# Patient Record
(demographics unavailable — no encounter records)

---

## 2024-10-29 NOTE — HISTORY OF PRESENT ILLNESS
[FreeTextEntry1] : establish care annual physical [de-identified] : Ms. YOON is a 67 y/o F with PMHx of who presents today for new patient eval and establish care. Patient feels well at this time, no complaints. Denies chest pain, sob, schmitt, dizziness, diaphoresis, palpitations, LE swelling, orthopnea, syncope, n/v, headache.

## 2024-10-29 NOTE — HEALTH RISK ASSESSMENT
[0] : 2) Feeling down, depressed, or hopeless: Not at all (0) [PHQ-2 Negative - No further assessment needed] : PHQ-2 Negative - No further assessment needed [Never] : Never [[id number]] : 0

## 2024-10-29 NOTE — HISTORY OF PRESENT ILLNESS
[FreeTextEntry1] : establish care annual physical [de-identified] : Ms. YOON is a 67 y/o F with PMHx of who presents today for new patient eval and establish care. Patient feels well at this time, no complaints. Denies chest pain, sob, schmitt, dizziness, diaphoresis, palpitations, LE swelling, orthopnea, syncope, n/v, headache.

## 2024-10-29 NOTE — ADDENDUM
[FreeTextEntry1] : This note was written by Angela Plummer on 10/28/2024 acting as medical scribe for Dr. Ruthann Wellington. I, Dr. Ruthann Wellington, have read and attest that all the information, medical decision making and discharge instructions within are true and accurate.

## 2024-10-29 NOTE — HEALTH RISK ASSESSMENT
[0] : 2) Feeling down, depressed, or hopeless: Not at all (0) [PHQ-2 Negative - No further assessment needed] : PHQ-2 Negative - No further assessment needed [Never] : Never [[id number]] : 0

## 2024-12-09 NOTE — ADDENDUM
[FreeTextEntry1] : Today's evaluation reviewed with Dr. Pritchard along with any changes and plan of care.

## 2024-12-09 NOTE — HISTORY OF PRESENT ILLNESS
[FreeTextEntry1] : Ms. YOON is a 69-year-old female who returns with regard to a history of hair thinning and obesity.  Regarding the hair thinning, the patient had been experiencing hair thinning and was following with dermatologist who found elevated testosterone level. Has been using a laser cap for the hair loss but has not seen any benefit. Hair thinning started in 2015.  Continues on Nutrofol at this time along with Viviscal. also on hair nail supplement as well as Minoxidil + Finasteride.  She did go for PRP which did provide some benefit, 3 sessions so far and is due for a session in November.  She reports nothing working and feels there has been an ongoing progression of hair thinning and hair loss since her last visit at last visit we suggested microneedling - following with derm  she is aware that GLP1 can play a role in hair thinning  ____________________________________________________________________________________________________  Regarding her weight, the patient is currently taking Mounjaro 12.5 mg but she takes it every 10 days.  She is tolerating the medication well with no side effects - Insurance does not cover Ozempic. She is paying $500 out-of-pocket for Mounjaro.  Her current weight is 177 lb  prior 180 lb in sept 2024   it does help with appetite suppression She is trying to eat more protein and fiber  She avoids alcohol   Lowest weight within last 5-10 years has been 170 lbs. Notes that her diet does not include processed foods. Diet is low in carbohydrates, but appetite is great, and she does eat large portions.  physical activity is minimal    She does have a history of Prediabetes and had gestational DM about 30 years ago and was using insulin.  Too with pos TPO ab with ANti TG Ab neg.  __________________________________________________________________________ Additional medical history includes that of NAFLD, Osteopenia (latest dexa 2024 and will send records), frequent UTIs on vaginal Estrace and Nitrofurantoin. estradiol 2x a week topical - Has had elevated Ferritin level and did see hematology. Was told no intervention required. Testing for mutations was negative.  off vitamin D3, pomi-t, turmeric  DEXA 12/14/23  spine -1.3  left Fn -2.2  left TH -1.3   FRAx major 12%  hip 2.2%

## 2025-02-04 NOTE — ADDENDUM
[FreeTextEntry1] : This note was written by Katia Camargo on 02/04/2025 acting as medical scribe for Dr. Aldo Pritchard. I, Dr. Aldo Pritchard, have read and attest that all the information, medical decision making and discharge instructions within are true and accurate.

## 2025-02-04 NOTE — HISTORY OF PRESENT ILLNESS
[FreeTextEntry1] : Ms. YOON is a 69-year-old female who returns with regard to a history of hair thinning and obesity.  Additional medical history includes that of NAFLD, Osteopenia (latest dexa 2024 and will send records), frequent UTIs on vaginal Estrace and Nitrofurantoin. estradiol 2x a week topical - Has had elevated Ferritin level and did see hematology. Was told no intervention required. Testing for mutations was negative.  Regarding the hair thinning, the patient had been experiencing hair thinning and was following with dermatologist who found elevated testosterone level. Has been using a laser cap for the hair loss but has not seen any benefit. Hair thinning started in 2015.  Continues on Nutrofol at this time along with Viviscal. also on hair nail supplement as well as Minoxidil + Finasteride.  She did go for PRP which did provide some benefit, 3 sessions so far and is due for a session in November.  She reports nothing working and feels there has been an ongoing progression of hair thinning and hair loss since her last visit At last visit we suggested microneedling - following with derm  She is aware that GLP1 can play a role in hair thinning  ____________________________________________________________________________________________________ Regarding her weight, the patient is currently taking Mounjaro 12.5 mg, but she takes it every 10 days.  She is tolerating the medication well with no side effects. - Insurance does not cover Ozempic. She is paying $500 out-of-pocket for Mounjaro.  Her current weight is 170 pounds, previously 175 pounds in Dec 2024.  Mounjaro does help with appetite suppression. She is trying to eat more protein and fiber. She avoids alcohol.  Lowest weight within last 5-10 years has been 170 lbs. Notes that her diet does not include processed foods. Diet is low in carbohydrates, but appetite is great, and she does eat large portions.  Physical activity is minimal   She does have a history of Prediabetes and had gestational DM about 30 years ago and was using insulin. - The patient's latest A1C returned at 5.5%. on 08/05/24  Too with pos TPO ab with ANti TG Ab neg.  ____________________________________________________________________________________________________ Is taking vitamin D along with fish oil.  DEXA 12/14/23  spine -1.3  left Fn -2.2  left TH -1.3   FRAx major 12%  hip 2.2%

## 2025-06-08 NOTE — ADDENDUM
[FreeTextEntry1] : This note was written by Hira Varghese on 06/06/2025 acting as medical scribe for Dr. Ruthann Wellington. I, Dr. Ruthann Wellington, have read and attest that all the information, medical decision making and discharge instructions within are true and accurate.

## 2025-06-08 NOTE — HEALTH RISK ASSESSMENT
[0] : 2) Feeling down, depressed, or hopeless: Not at all (0) [PHQ-2 Negative - No further assessment needed] : PHQ-2 Negative - No further assessment needed [Never] : Never [[id number]] : 0

## 2025-06-08 NOTE — PHYSICAL EXAM
[No Acute Distress] : no acute distress [Well Nourished] : well nourished [Well Developed] : well developed [Well-Appearing] : well-appearing [Normal Sclera/Conjunctiva] : normal sclera/conjunctiva [PERRL] : pupils equal round and reactive to light [EOMI] : extraocular movements intact [Normal Outer Ear/Nose] : the outer ears and nose were normal in appearance [Normal Oropharynx] : the oropharynx was normal [No JVD] : no jugular venous distention [No Lymphadenopathy] : no lymphadenopathy [Supple] : supple [Thyroid Normal, No Nodules] : the thyroid was normal and there were no nodules present [No Respiratory Distress] : no respiratory distress  [No Accessory Muscle Use] : no accessory muscle use [Clear to Auscultation] : lungs were clear to auscultation bilaterally [Normal Rate] : normal rate  [Regular Rhythm] : with a regular rhythm [Normal S1, S2] : normal S1 and S2 [No Murmur] : no murmur heard [No Carotid Bruits] : no carotid bruits [No Varicosities] : no varicosities [Pedal Pulses Present] : the pedal pulses are present [No Edema] : there was no peripheral edema [No Extremity Clubbing/Cyanosis] : no extremity clubbing/cyanosis [Soft] : abdomen soft [Non Tender] : non-tender [Non-distended] : non-distended [Normal Bowel Sounds] : normal bowel sounds [Normal Posterior Cervical Nodes] : no posterior cervical lymphadenopathy [Normal Anterior Cervical Nodes] : no anterior cervical lymphadenopathy [No CVA Tenderness] : no CVA  tenderness [No Spinal Tenderness] : no spinal tenderness [No Joint Swelling] : no joint swelling [Grossly Normal Strength/Tone] : grossly normal strength/tone [No Rash] : no rash [Coordination Grossly Intact] : coordination grossly intact [No Focal Deficits] : no focal deficits [Normal Gait] : normal gait [Normal Affect] : the affect was normal [Normal Insight/Judgement] : insight and judgment were intact [Alert and Oriented x3] : oriented to person, place, and time [de-identified] : nontoxic

## 2025-06-08 NOTE — PHYSICAL EXAM
[No Acute Distress] : no acute distress [Well Nourished] : well nourished [Well Developed] : well developed [Well-Appearing] : well-appearing [Normal Sclera/Conjunctiva] : normal sclera/conjunctiva [PERRL] : pupils equal round and reactive to light [EOMI] : extraocular movements intact [Normal Outer Ear/Nose] : the outer ears and nose were normal in appearance [Normal Oropharynx] : the oropharynx was normal [No JVD] : no jugular venous distention [No Lymphadenopathy] : no lymphadenopathy [Supple] : supple [Thyroid Normal, No Nodules] : the thyroid was normal and there were no nodules present [No Respiratory Distress] : no respiratory distress  [No Accessory Muscle Use] : no accessory muscle use [Clear to Auscultation] : lungs were clear to auscultation bilaterally [Normal Rate] : normal rate  [Regular Rhythm] : with a regular rhythm [Normal S1, S2] : normal S1 and S2 [No Murmur] : no murmur heard [No Carotid Bruits] : no carotid bruits [No Varicosities] : no varicosities [Pedal Pulses Present] : the pedal pulses are present [No Edema] : there was no peripheral edema [No Extremity Clubbing/Cyanosis] : no extremity clubbing/cyanosis [Soft] : abdomen soft [Non Tender] : non-tender [Non-distended] : non-distended [Normal Bowel Sounds] : normal bowel sounds [Normal Posterior Cervical Nodes] : no posterior cervical lymphadenopathy [Normal Anterior Cervical Nodes] : no anterior cervical lymphadenopathy [No CVA Tenderness] : no CVA  tenderness [No Spinal Tenderness] : no spinal tenderness [No Joint Swelling] : no joint swelling [Grossly Normal Strength/Tone] : grossly normal strength/tone [No Rash] : no rash [Coordination Grossly Intact] : coordination grossly intact [No Focal Deficits] : no focal deficits [Normal Gait] : normal gait [Normal Affect] : the affect was normal [Normal Insight/Judgement] : insight and judgment were intact [Alert and Oriented x3] : oriented to person, place, and time [de-identified] : nontoxic

## 2025-06-08 NOTE — HISTORY OF PRESENT ILLNESS
[Spouse] : spouse [FreeTextEntry8] : Ms. YOON is a 69-year-old female with PMHx of recurrent UTI, presenting today with c/o worsening URI symptoms, now w 102 fever. She reports going to urgent care on monday for cough, body ache, fever and Covid swab was negative on monday.   She went to urgent care again 2 days ago with worsening symptoms and was prescribed Promethazine, Ipratropium Bromide and Albuterol. She also took Tylenol OTC and says cough seems worse.  Denies chest pain, sob, schmitt, dizziness, diaphoresis, palpitations, LE swelling, orthopnea, syncope, n/v, headache.

## 2025-06-08 NOTE — HEALTH RISK ASSESSMENT
[0] : 2) Feeling down, depressed, or hopeless: Not at all (0) [PHQ-2 Negative - No further assessment needed] : PHQ-2 Negative - No further assessment needed [Never] : Never [[id number]] : 0

## 2025-07-07 NOTE — HISTORY OF PRESENT ILLNESS
[FreeTextEntry1] : Ms. YOON is a 69-year-old female who returns with regard to a history of hair thinning and obesity.  Additional medical history includes that of NAFLD, Osteopenia (latest dexa 2024 and will send records), frequent UTIs on vaginal Estrace and Nitrofurantoin. estradiol 2x a week topical - Has had elevated Ferritin level and did see hematology. Was told no intervention required. Testing for mutations was negative. - Too with pos TPO ab with neg TG Ab. - Past hx of gestational DM about 30 years ago and was on insulin - The patient's latest A1C returned at 5.5% on 08/05/24. - Had pneumonia in 06/2024.  Regarding the hair thinning, the patient had been experiencing hair thinning and was following with dermatologist who found elevated testosterone level. Had been using a laser cap for the hair loss but has not seen any benefit. Hair thinning started in 2015.  Continues on Nutrafol at this time. Also, on hair nail supplement as well as Minoxidil + Finasteride.  She did go for PRP which did provide some benefit, 3 sessions so far and is due for a session in November.  She reports nothing working and feels there has been an ongoing progression of hair thinning and hair loss since her last visit Now does microneedling and PRP - following with derm. She is aware that GLP1 can play a role in hair thinning. ____________________________________________________________________________________________________ Regarding her weight, the patient is currently taking Mounjaro 12.5 mg, but she takes it every 10 days.  She is tolerating the medication well with no side effects. - Insurance does not cover Ozempic. She is paying $650 monthly out-of-pocket for Mounjaro.  Her current weight is 161 pounds, previously 175 pounds in Dec 2024.  She is trying to eat more protein and fiber. She avoids alcohol and orange juice.  Lowest weight within last 5-10 years has been 170 lbs. Notes that her diet does not include processed foods. Diet is low in carbohydrates, but appetite is great, and she does eat large portions.  Physical activity is minimal  ____________________________________________________________________________________________________ Is taking vitamin D along with fish oil.  DEXA 12/14/23  spine -1.3  left FN -2.2  left TH -1.3   FRAX major 12%  hip 2.2%   Diet is sufficient in calcium.

## 2025-07-07 NOTE — ADDENDUM
[FreeTextEntry1] : This note was written by Katia Camargo on 07/07/2025 acting as medical scribe for Dr. Aldo Pritchard. I, Dr. Aldo Pritchard, have read and attest that all the information, medical decision making and discharge instructions within are true and accurate.